# Patient Record
(demographics unavailable — no encounter records)

---

## 2024-11-11 NOTE — ASSESSMENT
[FreeTextEntry1] : , Sinusitis: Viral swabs negative Z Lizy nasal sinus rinse.   Obesity: Counseling on diet, exercise and lifestyle modifications. Weight loss goals discussed Nutritionist  Rx Wegovy   PreDiabetes: HbA1C % = 5.7 Counseled patient to cut down on processed sugar and carbohydrates. Increase Aerobic exercise and resistance training.